# Patient Record
Sex: FEMALE | Race: WHITE | NOT HISPANIC OR LATINO | Employment: FULL TIME
[De-identification: names, ages, dates, MRNs, and addresses within clinical notes are randomized per-mention and may not be internally consistent; named-entity substitution may affect disease eponyms.]

---

## 2017-09-17 ENCOUNTER — HEALTH MAINTENANCE LETTER (OUTPATIENT)
Age: 32
End: 2017-09-17

## 2020-01-30 ENCOUNTER — TELEPHONE (OUTPATIENT)
Dept: FAMILY MEDICINE | Facility: OTHER | Age: 35
End: 2020-01-30

## 2020-01-30 ENCOUNTER — OFFICE VISIT (OUTPATIENT)
Dept: FAMILY MEDICINE | Facility: OTHER | Age: 35
End: 2020-01-30
Payer: COMMERCIAL

## 2020-01-30 ENCOUNTER — RESULT FOLLOW UP (OUTPATIENT)
Dept: FAMILY MEDICINE | Facility: OTHER | Age: 35
End: 2020-01-30

## 2020-01-30 VITALS
HEIGHT: 65 IN | RESPIRATION RATE: 14 BRPM | SYSTOLIC BLOOD PRESSURE: 104 MMHG | TEMPERATURE: 96.9 F | BODY MASS INDEX: 26.99 KG/M2 | OXYGEN SATURATION: 100 % | DIASTOLIC BLOOD PRESSURE: 60 MMHG | HEART RATE: 73 BPM | WEIGHT: 162 LBS

## 2020-01-30 DIAGNOSIS — F33.2 SEVERE EPISODE OF RECURRENT MAJOR DEPRESSIVE DISORDER, WITHOUT PSYCHOTIC FEATURES (H): ICD-10-CM

## 2020-01-30 DIAGNOSIS — Z00.00 ROUTINE GENERAL MEDICAL EXAMINATION AT A HEALTH CARE FACILITY: Primary | ICD-10-CM

## 2020-01-30 DIAGNOSIS — Z00.00 ENCOUNTER FOR MEDICAL EXAMINATION TO ESTABLISH CARE: ICD-10-CM

## 2020-01-30 DIAGNOSIS — Z12.4 SCREENING FOR CERVICAL CANCER: ICD-10-CM

## 2020-01-30 DIAGNOSIS — A59.9 TRICHOMONAS INFECTION: ICD-10-CM

## 2020-01-30 DIAGNOSIS — Z11.3 SCREEN FOR STD (SEXUALLY TRANSMITTED DISEASE): ICD-10-CM

## 2020-01-30 DIAGNOSIS — F41.1 GAD (GENERALIZED ANXIETY DISORDER): ICD-10-CM

## 2020-01-30 DIAGNOSIS — F15.91 HISTORY OF METHAMPHETAMINE USE: ICD-10-CM

## 2020-01-30 DIAGNOSIS — Z13.1 SCREENING FOR DIABETES MELLITUS: ICD-10-CM

## 2020-01-30 DIAGNOSIS — Z13.6 CARDIOVASCULAR SCREENING; LDL GOAL LESS THAN 160: ICD-10-CM

## 2020-01-30 LAB
SPECIMEN SOURCE: ABNORMAL
WET PREP SPEC: ABNORMAL

## 2020-01-30 PROCEDURE — 99213 OFFICE O/P EST LOW 20 MIN: CPT | Mod: 25 | Performed by: NURSE PRACTITIONER

## 2020-01-30 PROCEDURE — 87591 N.GONORRHOEAE DNA AMP PROB: CPT | Performed by: NURSE PRACTITIONER

## 2020-01-30 PROCEDURE — 87624 HPV HI-RISK TYP POOLED RSLT: CPT | Performed by: NURSE PRACTITIONER

## 2020-01-30 PROCEDURE — 87210 SMEAR WET MOUNT SALINE/INK: CPT | Performed by: NURSE PRACTITIONER

## 2020-01-30 PROCEDURE — G0476 HPV COMBO ASSAY CA SCREEN: HCPCS | Performed by: NURSE PRACTITIONER

## 2020-01-30 PROCEDURE — 99385 PREV VISIT NEW AGE 18-39: CPT | Performed by: NURSE PRACTITIONER

## 2020-01-30 PROCEDURE — 87491 CHLMYD TRACH DNA AMP PROBE: CPT | Performed by: NURSE PRACTITIONER

## 2020-01-30 PROCEDURE — G0145 SCR C/V CYTO,THINLAYER,RESCR: HCPCS | Performed by: NURSE PRACTITIONER

## 2020-01-30 RX ORDER — SERTRALINE HYDROCHLORIDE 25 MG/1
25 TABLET, FILM COATED ORAL DAILY
Qty: 30 TABLET | Refills: 1 | Status: SHIPPED | OUTPATIENT
Start: 2020-01-30 | End: 2020-03-02

## 2020-01-30 RX ORDER — METRONIDAZOLE 500 MG/1
2000 TABLET ORAL ONCE
Qty: 4 TABLET | Refills: 0 | Status: SHIPPED | OUTPATIENT
Start: 2020-01-30 | End: 2020-03-02

## 2020-01-30 ASSESSMENT — ENCOUNTER SYMPTOMS
FEVER: 0
ARTHRALGIAS: 0
MYALGIAS: 0
NERVOUS/ANXIOUS: 1
CHILLS: 0
HEADACHES: 0
DYSURIA: 0
SORE THROAT: 0
COUGH: 0
JOINT SWELLING: 0
SHORTNESS OF BREATH: 0
PALPITATIONS: 0
ABDOMINAL PAIN: 1
HEMATURIA: 0
WEAKNESS: 0
CONSTIPATION: 0
EYE PAIN: 0
DIARRHEA: 0
FREQUENCY: 0
NAUSEA: 0
DIZZINESS: 0
HEARTBURN: 0
PARESTHESIAS: 0
BREAST MASS: 0
HEMATOCHEZIA: 0

## 2020-01-30 ASSESSMENT — PAIN SCALES - GENERAL: PAINLEVEL: NO PAIN (0)

## 2020-01-30 ASSESSMENT — MIFFLIN-ST. JEOR: SCORE: 1430.09

## 2020-01-30 ASSESSMENT — PATIENT HEALTH QUESTIONNAIRE - PHQ9: SUM OF ALL RESPONSES TO PHQ QUESTIONS 1-9: 9

## 2020-01-30 NOTE — NURSING NOTE
Health Maintenance Due   Topic Date Due     PREVENTIVE CARE VISIT  1983     DTAP/TDAP/TD IMMUNIZATION (1 - Tdap) 06/30/1994     HIV SCREENING  06/30/1998     PNEUMOCOCCAL IMMUNIZATION 19-64 MEDIUM RISK (1 of 1 - PPSV23) 06/30/2002     PAP  06/30/2004     INFLUENZA VACCINE (1) 09/01/2019     PHQ-2  01/01/2020      .Health Maintenance reviewed at today's visit patient asked to schedule/complete:   Immunizations:  Patient agrees to schedule     Apple Padilla LPN........1/30/2020 10:12 AM

## 2020-01-30 NOTE — TELEPHONE ENCOUNTER
----- Message from JAYSON Seymour CNP sent at 1/30/2020 11:42 AM CST -----  Please call and let her know her wet prep showed trichomonas.  I sent over Flagyl for her to take for this.      The rest of the labs are pending.     Electronically signed by Cristina De Anda CNP.

## 2020-01-30 NOTE — PATIENT INSTRUCTIONS
The results of the pap test take about 2 weeks to come back.  We will send a letter with these results and the recommendations for further pap tests in the future.     Schedule a lab visit and be fasting for that.     Start Sertraline for mood.      Follow up in 1-2 months.       Preventive Health Recommendations  Female Ages 26 - 39  Yearly exam:   See your health care provider every year in order to    Review health changes.     Discuss preventive care.      Review your medicines if you your doctor has prescribed any.    Until age 30: Get a Pap test every three years (more often if you have had an abnormal result).    After age 30: Talk to your doctor about whether you should have a Pap test every 3 years or have a Pap test with HPV screening every 5 years.   You do not need a Pap test if your uterus was removed (hysterectomy) and you have not had cancer.  You should be tested each year for STDs (sexually transmitted diseases), if you're at risk.   Talk to your provider about how often to have your cholesterol checked.  If you are at risk for diabetes, you should have a diabetes test (fasting glucose).  Shots: Get a flu shot each year. Get a tetanus shot every 10 years.   Nutrition:     Eat at least 5 servings of fruits and vegetables each day.    Eat whole-grain bread, whole-wheat pasta and brown rice instead of white grains and rice.    Get adequate Calcium and Vitamin D.     Lifestyle    Exercise at least 150 minutes a week (30 minutes a day, 5 days of the week). This will help you control your weight and prevent disease.    Limit alcohol to one drink per day.    No smoking.     Wear sunscreen to prevent skin cancer.    See your dentist every six months for an exam and cleaning.

## 2020-01-30 NOTE — PROGRESS NOTES
SUBJECTIVE:   CC: Dyan Shanks is an 34 year old woman who presents for preventive health visit.     Healthy Habits:     Getting at least 3 servings of Calcium per day:  Yes    Bi-annual eye exam:  NO    Dental care twice a year:  Yes    Sleep apnea or symptoms of sleep apnea:  None    Diet:  Regular (no restrictions)    Frequency of exercise:  None    Taking medications regularly:  Yes    Medication side effects:  None    PHQ-2 Total Score: 2    Additional concerns today:  No    Also wants to establish care and discuss depression.  History of methamphetamine use, has been clean for 2.5 months now.  Did this on her own, is not set up with any treatment program.  She is set up to see a counselor in [place] in 2 weeks.  Feels she is managing okay, but has some anxiety and depression symptoms.  Has been on Sertraline previously, unsure why she stopped this but wants to get back on something.  Denies thoughts of self harm.      Also wants STD screening. No symptoms and no know exposures but wants testing for everything.        PHQ-9 score:    PHQ-9 SCORE 1/30/2020   PHQ-9 Total Score 9       Today's PHQ-2 Score:   PHQ-2 ( 1999 [employer]) 1/30/2020   Q1: Little interest or pleasure in doing things 1   Q2: Feeling down, depressed or hopeless 1   PHQ-2 Score 2   Q1: Little interest or pleasure in doing things Several days   Q2: Feeling down, depressed or hopeless Several days   PHQ-2 Score 2       Social History     Tobacco Use     Smoking status: Current Every Day Smoker     Packs/day: 0.50     Types: Cigarettes     Smokeless tobacco: Never Used   Substance Use Topics     Alcohol use: Yes     Comment: socially       Alcohol Use 1/30/2020   Prescreen: >3 drinks/day or >7 drinks/week? No   No flowsheet data found.    Reviewed orders with patient.  Reviewed health maintenance and updated orders accordingly - Yes  Lab work is in process    Mammogram not appropriate for this patient based on age.    Pertinent mammograms  "are reviewed under the imaging tab.  History of abnormal Pap smear: Last 3 Pap Results: No results found for: PAP     Reviewed and updated as needed this visit by clinical staff  Tobacco  Allergies  Meds  Med Hx  Surg Hx  Fam Hx  Soc Hx        Reviewed and updated as needed this visit by Provider            Review of Systems   Constitutional: Negative for chills and fever.   HENT: Negative for congestion, ear pain, hearing loss and sore throat.    Eyes: Negative for pain and visual disturbance.   Respiratory: Negative for cough and shortness of breath.    Cardiovascular: Negative for chest pain, palpitations and peripheral edema.   Gastrointestinal: Positive for abdominal pain. Negative for constipation, diarrhea, heartburn, hematochezia and nausea.   Breasts:  Negative for tenderness, breast mass and discharge.   Genitourinary: Positive for pelvic pain, vaginal bleeding and vaginal discharge. Negative for dysuria, frequency, genital sores, hematuria and urgency.   Musculoskeletal: Negative for arthralgias, joint swelling and myalgias.   Skin: Negative for rash.   Neurological: Negative for dizziness, weakness, headaches and paresthesias.   Psychiatric/Behavioral: Negative for mood changes. The patient is nervous/anxious.        OBJECTIVE:   /60 (BP Location: Left arm, Patient Position: Sitting, Cuff Size: Adult Regular)   Pulse 73   Temp 96.9  F (36.1  C) (Temporal)   Resp 14   Ht 1.658 m (5' 5.28\")   Wt 73.5 kg (162 lb)   LMP 12/30/2019   SpO2 100%   Breastfeeding No   BMI 26.73 kg/m    Physical Exam  GENERAL: healthy, alert and no distress  EYES: Eyes grossly normal to inspection, PERRL and conjunctivae and sclerae normal  HENT: ear canals and TM's normal, nose and mouth without ulcers or lesions  NECK: no adenopathy, no asymmetry, masses, or scars and thyroid normal to palpation  RESP: lungs clear to auscultation - no rales, rhonchi or wheezes  BREAST: normal without masses, tenderness or " nipple discharge and no palpable axillary masses or adenopathy  CV: regular rate and rhythm, normal S1 S2, no S3 or S4, no murmur, click or rub  ABDOMEN: soft, nontender, no hepatosplenomegaly, no masses and bowel sounds normal   (female): normal female external genitalia, normal urethral meatus, vaginal mucosa pink, moist, well rugated, and normal cervix without masses or discharge  MS: no gross musculoskeletal defects noted, no edema  SKIN: no suspicious lesions or rashes  NEURO: Normal strength and tone, mentation intact and speech normal  PSYCH: mentation appears normal, affect normal/bright    Diagnostic Test Results:  Office Visit on 01/30/2020   Component Date Value Ref Range Status     Specimen Description 01/30/2020 Vagina   Final     Wet Prep 01/30/2020 Trichomonas seen*  Final     Wet Prep 01/30/2020 No clue cells seen   Final     Wet Prep 01/30/2020 No yeast seen   Final     Wet Prep 01/30/2020    Final                    Value:Few  WBC'S seen           ASSESSMENT/PLAN:   1. Routine general medical examination at a health care facility    2. Encounter for medical examination to establish care    3. KARMEN (generalized anxiety disorder)  Discussed treatment options including pharmacologic and nonpharmacologic options.  Patient is set up to start counseling in [place].  Start Sertraline as prescribed.  Risks/benefits discussed, including risk of suicidal ideations.  Patient denies any suicidal ideations today.  Discussed that medication will take 4-6 weeks to start working.  Follow up for recheck in 1 month.    - sertraline (ZOLOFT) 25 MG tablet; Take 1 tablet (25 mg) by mouth daily  Dispense: 30 tablet; Refill: 1    4. Severe episode of recurrent major depressive disorder, without psychotic features (H)  Discussed treatment options including pharmacologic and nonpharmacologic options.  Patient is set up to start counseling in [place].  Start Sertraline as prescribed.  Risks/benefits discussed,  "including risk of suicidal ideations.  Patient denies any suicidal ideations today.  Discussed that medication will take 4-6 weeks to start working.  Follow up for recheck in 1 month.    - sertraline (ZOLOFT) 25 MG tablet; Take 1 tablet (25 mg) by mouth daily  Dispense: 30 tablet; Refill: 1    5. Screening for cervical cancer  - Pap imaged thin layer screen with HPV - recommended age 30 - 65  - HPV High Risk Types DNA Cervical    6. Screen for STD (sexually transmitted disease)  - HIV Screening; Future  - **Hepatitis C Screen Reflex to RNA FUTURE anytime; Future  - Hepatitis B surface antigen; Future  - HIV Antigen Antibody Combo; Future  - Treponema Abs w Reflex to RPR and Titer; Future  - Wet prep  - NEISSERIA GONORRHOEA PCR  - CHLAMYDIA TRACHOMATIS PCR    7. CARDIOVASCULAR SCREENING; LDL GOAL LESS THAN 160  - Lipid panel reflex to direct LDL Fasting; Future    8. Screening for diabetes mellitus  - **Glucose FUTURE anytime; Future    9. Trichomonas infection  - metroNIDAZOLE (FLAGYL) 500 MG tablet; Take 4 tablets (2,000 mg) by mouth once for 1 dose  Dispense: 4 tablet; Refill: 0    COUNSELING:  Reviewed preventive health counseling, as reflected in patient instructions    Estimated body mass index is 26.73 kg/m  as calculated from the following:    Height as of this encounter: 1.658 m (5' 5.28\").    Weight as of this encounter: 73.5 kg (162 lb).    Weight management plan: Discussed healthy diet and exercise guidelines     reports that she has been smoking cigarettes. She has been smoking about 0.50 packs per day. She has never used smokeless tobacco.  Tobacco Cessation Action Plan: Information offered: Patient not interested at this time    Counseling Resources:  ATP IV Guidelines  Pooled Cohorts Equation Calculator  Breast Cancer Risk Calculator  FRAX Risk Assessment  ICSI Preventive Guidelines  Dietary Guidelines for Americans, 2010  USDA's MyPlate  ASA Prophylaxis  Lung CA Screening    In addition to the " preventive visit, 25  minutes of the appointment were spent evaluating and developing a treatment plan for her additional concern(s).        JAYSON Seymour [facility]

## 2020-01-30 NOTE — TELEPHONE ENCOUNTER
LMTCB- When patient returns call please relay message from provider. Apple Padilla LPN........1/30/2020 12:53 PM

## 2020-01-31 ENCOUNTER — TELEPHONE (OUTPATIENT)
Dept: FAMILY MEDICINE | Facility: OTHER | Age: 35
End: 2020-01-31

## 2020-01-31 DIAGNOSIS — A74.9 CHLAMYDIA INFECTION: Primary | ICD-10-CM

## 2020-01-31 LAB
C TRACH DNA SPEC QL NAA+PROBE: POSITIVE
N GONORRHOEA DNA SPEC QL NAA+PROBE: NEGATIVE
SPECIMEN SOURCE: ABNORMAL
SPECIMEN SOURCE: NORMAL

## 2020-01-31 RX ORDER — AZITHROMYCIN 500 MG/1
1000 TABLET, FILM COATED ORAL DAILY
Qty: 2 TABLET | Refills: 0 | Status: SHIPPED | OUTPATIENT
Start: 2020-01-31 | End: 2020-03-02

## 2020-01-31 NOTE — TELEPHONE ENCOUNTER
----- Message from JAYSON Seymour CNP sent at 1/31/2020  2:27 PM CST -----  Please call and let patient know her chlamydia test was positive.  I have sent over a prescription for her to take for this.  She should have follow up testing done in 3 months.     Electronically signed by Cristina De Anda CNP.

## 2020-01-31 NOTE — TELEPHONE ENCOUNTER
Called and LM for patient to call back. Please relay results below to patient. Cristina sent patient results in mychart as well.     Valentina Kaur MA

## 2020-01-31 NOTE — LETTER
February 3, 2020      Dyan Shanks  [id number] [facility] [address]        Dear ,    We are writing to inform you of your test results.    A prescription has been sent to your pharmacy. Please have retesting done in 3 months.     Resulted Orders   Wet prep   Result Value Ref Range    Specimen Description Vagina     Wet Prep Trichomonas seen (A)     Wet Prep No clue cells seen     Wet Prep No yeast seen     Wet Prep Few  WBC'S seen      NEISSERIA GONORRHOEA PCR   Result Value Ref Range    Specimen Descrip Vagina     N Gonorrhea PCR Negative NEG^Negative      Comment:      Negative for N. gonorrhoeae rRNA by transcription mediated amplification.  A negative result by transcription mediated amplification does not preclude   the presence of N. gonorrhoeae infection because results are dependent on   proper and adequate collection, absence of inhibitors, and sufficient rRNA to   be detected.     CHLAMYDIA TRACHOMATIS PCR   Result Value Ref Range    Specimen Description Vagina     Chlamydia Trachomatis PCR Positive (A) NEG^Negative      Comment:      Positive for C. trachomatis rRNA by transcription mediated amplification.  As is true for all non-culture methods, a positive specimen obtained from a   patient after therapeutic treatment cannot be interpreted as indicating the   presence of viable C. trachomatis.  Significant Value faxed/printed to  [facility] at 1330 on 1.31.20 rd.         If you have any questions or concerns, please call the clinic at the number listed above.       Sincerely,        JAYSON Seymour CNP

## 2020-02-03 LAB
COPATH REPORT: NORMAL
PAP: NORMAL

## 2020-02-03 NOTE — TELEPHONE ENCOUNTER
Patient called back, relayed message above.        Estephania Pina ~ Patient Representative  [address] [address]  [email or web address]  [email or web address]  Office:  [phone]  Fax:  [phone]

## 2020-02-03 NOTE — TELEPHONE ENCOUNTER
2nd attempt, LM for patient to call back. Please relay results below to patient.     Letter sent to patient as well.     Routing to pcp as FYI unable to reach patient, have called sent mychart and letter.       Valentina Kaur MA

## 2020-02-04 LAB
FINAL DIAGNOSIS: ABNORMAL
HPV HR 12 DNA CVX QL NAA+PROBE: POSITIVE
HPV16 DNA SPEC QL NAA+PROBE: NEGATIVE
HPV18 DNA SPEC QL NAA+PROBE: NEGATIVE
SPECIMEN DESCRIPTION: ABNORMAL
SPECIMEN SOURCE CVX/VAG CYTO: ABNORMAL

## 2020-02-05 PROBLEM — R87.810 CERVICAL HIGH RISK HPV (HUMAN PAPILLOMAVIRUS) TEST POSITIVE: Status: ACTIVE | Noted: 2020-01-30

## 2020-03-02 ENCOUNTER — OFFICE VISIT (OUTPATIENT)
Dept: FAMILY MEDICINE | Facility: OTHER | Age: 35
End: 2020-03-02
Payer: COMMERCIAL

## 2020-03-02 VITALS
BODY MASS INDEX: 28.31 KG/M2 | DIASTOLIC BLOOD PRESSURE: 70 MMHG | TEMPERATURE: 97.6 F | HEART RATE: 67 BPM | SYSTOLIC BLOOD PRESSURE: 124 MMHG | RESPIRATION RATE: 16 BRPM | WEIGHT: 171.6 LBS | OXYGEN SATURATION: 100 %

## 2020-03-02 DIAGNOSIS — F33.2 SEVERE EPISODE OF RECURRENT MAJOR DEPRESSIVE DISORDER, WITHOUT PSYCHOTIC FEATURES (H): Primary | ICD-10-CM

## 2020-03-02 DIAGNOSIS — M79.671 BILATERAL FOOT PAIN: ICD-10-CM

## 2020-03-02 DIAGNOSIS — M79.672 BILATERAL FOOT PAIN: ICD-10-CM

## 2020-03-02 DIAGNOSIS — L30.9 ECZEMA, UNSPECIFIED TYPE: ICD-10-CM

## 2020-03-02 DIAGNOSIS — F41.1 GAD (GENERALIZED ANXIETY DISORDER): ICD-10-CM

## 2020-03-02 PROCEDURE — 99214 OFFICE O/P EST MOD 30 MIN: CPT | Performed by: NURSE PRACTITIONER

## 2020-03-02 RX ORDER — TRIAMCINOLONE ACETONIDE 1 MG/G
OINTMENT TOPICAL 2 TIMES DAILY
Qty: 30 G | Refills: 0 | Status: SHIPPED | OUTPATIENT
Start: 2020-03-02 | End: 2021-05-12

## 2020-03-02 ASSESSMENT — PAIN SCALES - GENERAL: PAINLEVEL: NO PAIN (0)

## 2020-03-02 NOTE — NURSING NOTE
Health Maintenance Due   Topic Date Due     DEPRESSION ACTION PLAN  1983     DTAP/TDAP/TD IMMUNIZATION (2 - Tdap) 06/30/1994     HIV SCREENING  06/30/1998     PNEUMOCOCCAL IMMUNIZATION 19-64 MEDIUM RISK (1 of 1 - PPSV23) 06/30/2002     PAP FOLLOW-UP  01/30/2021     HPV FOLLOW-UP  01/30/2021      Health Maintenance reviewed at today's visit patient asked to schedule/complete:   Cervical Cancer:  Patient agrees to schedule  Immunizations:  Patient declined     Apple Padilla LPN........3/2/2020 3:27 PM

## 2020-03-02 NOTE — PATIENT INSTRUCTIONS
Referral for podiatry placed.     Use the steroid cream twice a day until the rash resolves.     Increase your Sertraline to 50 mg daily.  Recheck in 4 weeks.

## 2020-03-02 NOTE — PROGRESS NOTES
Subjective     Dyan Shanks is a 34 year old female who presents to clinic today for the following health issues:    HPI   Rash  Onset: Ongoing for a while     Description:   Location: bilateral legs   Character: round, raised, flakey, red  Itching (Pruritis): YES    Progression of Symptoms:  worsening    Accompanying Signs & Symptoms:  Fever: no   Body aches or joint pain: no   Sore throat symptoms: no   Recent cold symptoms: no     History:   Previous similar rash: YES- dx with eczema or psoriasis      Precipitating factors:   Exposure to similar rash: no   New exposures: None   Recent travel: no     Alleviating factors:  NA     Therapies Tried and outcome: None     Depression and Anxiety Follow-Up    How are you doing with your depression since your last visit? Improved     How are you doing with your anxiety since your last visit?  Improved     Are you having other symptoms that might be associated with depression or anxiety? No    Have you had a significant life event? No     Do you have any concerns with your use of alcohol or other drugs? No    Social History     Tobacco Use     Smoking status: Former Smoker     Packs/day: 0.50     Types: Cigarettes     Smokeless tobacco: Never Used   Substance Use Topics     Alcohol use: Yes     Comment: socially     Drug use: No     PHQ 1/30/2020   PHQ-9 Total Score 9   Q9: Thoughts of better off dead/self-harm past 2 weeks Not at all     No flowsheet data found.    Started on Sertraline 6 weeks ago.  Symptoms slightly improved.  No side effects.          Review of Systems   ROS COMP: Constitutional, HEENT, cardiovascular, pulmonary, gi and gu systems are negative, except as otherwise noted.      Objective    /70 (BP Location: Left arm, Patient Position: Sitting, Cuff Size: Adult Regular)   Pulse 67   Temp 97.6  F (36.4  C) (Temporal)   Resp 16   Wt 77.8 kg (171 lb 9.6 oz)   SpO2 100%   Breastfeeding No   BMI 28.31 kg/m    Body mass index is 28.31  kg/m .  Physical Exam   GENERAL: healthy, alert and no distress  MS: no gross musculoskeletal defects noted, no edema  SKIN: dry, scaling patches on both lower legs.  Consistent with eczema.  No open lesions or infection.   PSYCH: mentation appears normal, affect flat, judgement and insight intact and appearance well groomed    Diagnostic Test Results:  none         Assessment & Plan     1. Severe episode of recurrent major depressive disorder, without psychotic features (H)  Increase Sertraline to 50 mg daily and recheck in 4 weeks.    - sertraline (ZOLOFT) 50 MG tablet; Take 1 tablet (50 mg) by mouth daily  Dispense: 30 tablet; Refill: 3    2. KARMEN (generalized anxiety disorder)  Increase Sertraline to 50 mg daily and recheck in 4 weeks.    - sertraline (ZOLOFT) 50 MG tablet; Take 1 tablet (50 mg) by mouth daily  Dispense: 30 tablet; Refill: 3    3. Bilateral foot pain  - PODIATRY/FOOT & ANKLE SURGERY REFERRAL    4. Eczema, unspecified type  - triamcinolone (KENALOG) 0.1 % external ointment; Apply topically 2 times daily  Dispense: 30 g; Refill: 0       See Patient Instructions    Return in about 4 weeks (around 3/30/2020) for Follow up.    JAYSON Seymour [facility]

## 2020-03-12 ENCOUNTER — OFFICE VISIT (OUTPATIENT)
Dept: PODIATRY | Facility: CLINIC | Age: 35
End: 2020-03-12
Attending: NURSE PRACTITIONER
Payer: COMMERCIAL

## 2020-03-12 ENCOUNTER — ANCILLARY PROCEDURE (OUTPATIENT)
Dept: GENERAL RADIOLOGY | Facility: CLINIC | Age: 35
End: 2020-03-12
Attending: PODIATRIST
Payer: COMMERCIAL

## 2020-03-12 VITALS
HEIGHT: 65 IN | WEIGHT: 170 LBS | DIASTOLIC BLOOD PRESSURE: 74 MMHG | SYSTOLIC BLOOD PRESSURE: 110 MMHG | BODY MASS INDEX: 28.32 KG/M2

## 2020-03-12 DIAGNOSIS — M79.672 BILATERAL FOOT PAIN: ICD-10-CM

## 2020-03-12 DIAGNOSIS — M79.671 BILATERAL FOOT PAIN: ICD-10-CM

## 2020-03-12 DIAGNOSIS — L85.9 HYPERKERATOSIS: Primary | ICD-10-CM

## 2020-03-12 DIAGNOSIS — Q66.6 PES VALGUS OF RIGHT FOOT: ICD-10-CM

## 2020-03-12 PROCEDURE — 99243 OFF/OP CNSLTJ NEW/EST LOW 30: CPT | Mod: 25 | Performed by: PODIATRIST

## 2020-03-12 PROCEDURE — 73630 X-RAY EXAM OF FOOT: CPT | Mod: TC

## 2020-03-12 PROCEDURE — 11056 PARNG/CUTG B9 HYPRKR LES 2-4: CPT | Performed by: PODIATRIST

## 2020-03-12 ASSESSMENT — PAIN SCALES - GENERAL: PAINLEVEL: SEVERE PAIN (7)

## 2020-03-12 ASSESSMENT — MIFFLIN-ST. JEOR: SCORE: 1466.43

## 2020-03-12 NOTE — PROGRESS NOTES
HPI:  Dyan Shanks is a 34 year old female who is seen in consultation at the request of Cristina De Anda, JAYSON, CNP    Pt presents for eval of:   (Onset, Location, L/R, Character, Treatments, Injury if yes)    XR Left and Right foot today 3/12/2020     Ongoing for 1+ year, plantar Left and Right ball of foot callus pain. No injury noted. Presents today with compression socks and athletic shoes.  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    Weight management plan: Patient was referred to their PCP to discuss a diet and exercise plan.     Patient to follow up with Primary Care provider regarding elevated blood pressure.    ROS:  10 point ROS neg other than the symptoms noted above in the HPI.    Patient Active Problem List   Diagnosis     CARDIOVASCULAR SCREENING; LDL GOAL LESS THAN 160     Spontaneous onset of labor after 37 but before 39 completed weeks gestation with delivery by planned  section     Insufficient prenatal care     History of methamphetamine use      (normal spontaneous vaginal delivery)     Anemia     KARMEN (generalized anxiety disorder)     Severe episode of recurrent major depressive disorder, without psychotic features (H)     Cervical high risk HPV (human papillomavirus) test positive       PAST MEDICAL HISTORY:   Past Medical History:   Diagnosis Date     Anxiety      Cervical high risk HPV (human papillomavirus) test positive 2020    See problem list     Depression      Methamphetamine use (H)         PAST SURGICAL HISTORY: History reviewed. No pertinent surgical history.     MEDICATIONS:   Current Outpatient Medications:      levonorgestrel (MIRENA) 20 MCG/24HR IUD, 1 each by Intrauterine route once Replace 2023, Disp: , Rfl:      sertraline (ZOLOFT) 50 MG tablet, Take 1 tablet (50 mg) by mouth daily, Disp: 30 tablet, Rfl:  3     triamcinolone (KENALOG) 0.1 % external ointment, Apply topically 2 times daily, Disp: 30 g, Rfl: 0     ALLERGIES:    Allergies   Allergen Reactions     Blood Transfusion Related (Informational Only) Other (See Comments)     Patient has a history of a clinically significant antibody against RBC antigens.  A delay in compatible RBCs may occur.     Blood-Group Specific Substance      Patient has an anti-Big E. Blood product orders may be delayed. Draw two purple top tubes and one red top tube for all Type and Screen/Type and Crossmatch orders.     Oxycodone-Acetaminophen Itching and Nausea and Vomiting     Legs itch        SOCIAL HISTORY:   Social History     Socioeconomic History     Marital status: Single     Spouse name: Not on file     Number of children: Not on file     Years of education: Not on file     Highest education level: Not on file   Occupational History     Not on file   Social Needs     Financial resource strain: Not on file     Food insecurity     Worry: Not on file     Inability: Not on file     Transportation needs     Medical: Not on file     Non-medical: Not on file   Tobacco Use     Smoking status: Former Smoker     Packs/day: 0.50     Types: Cigarettes     Smokeless tobacco: Never Used   Substance and Sexual Activity     Alcohol use: Yes     Comment: socially     Drug use: No     Sexual activity: Not Currently     Partners: Male   Lifestyle     Physical activity     Days per week: Not on file     Minutes per session: Not on file     Stress: Not on file   Relationships     Social connections     Talks on phone: Not on file     Gets together: Not on file     Attends Taoist service: Not on file     Active member of club or organization: Not on file     Attends meetings of clubs or organizations: Not on file     Relationship status: Not on file     Intimate partner violence     Fear of current or ex partner: Not on file     Emotionally abused: Not on file     Physically abused: Not on file  "    Forced sexual activity: Not on file   Other Topics Concern     Not on file   Social History Narrative     Not on file        FAMILY HISTORY: History reviewed. No pertinent family history.     EXAM:Vitals: /74 (BP Location: Left arm, Cuff Size: Adult Regular)   Ht 1.658 m (5' 5.28\")   Wt 77.1 kg (170 lb)   BMI 28.05 kg/m    BMI= Body mass index is 28.05 kg/m .    General appearance: Patient is alert and fully cooperative with history & exam.  No sign of distress is noted during the visit.     Psychiatric: Affect is pleasant & appropriate.  Patient appears motivated to improve health.     Respiratory: Breathing is regular & unlabored while sitting.     HEENT: Hearing is intact to spoken word.  Speech is clear.  No gross evidence of visual impairment that would impact ambulation.     Vascular: DP & PT pulses are intact & regular bilaterally.  No significant edema or varicosities noted.  CFT and skin temperature is normal to both lower extremities.     Neurologic: Lower extremity sensation is intact to light touch.  No evidence of weakness or contracture in the lower extremities.  No evidence of neuropathy.    Dermatologic: Skin is intact to both lower extremities with adequate texture, turgor and tone about the integument.  No paronychia or evidence of soft tissue infection is noted.     Musculoskeletal: Patient is ambulatory without assistive device or brace.  Abnormal metatarsal length parabola with forefoot valgus noted bilateral.  Prominence about the second and third metatarsal head and hyperkeratosis about the plantar third metatarsal head bilateral.  Also fifth metatarsal head on the left foot has a demonstrate hyperkeratosis.  Skin lines remain intact.    Radiographs 3/12/2020 demonstrate abnormal metatarsal length consistent with pes valgus.  No acute cortical reaction.     ASSESSMENT:       ICD-10-CM    1. Hyperkeratosis  L85.9 ORTHOTICS REFERRAL     TRIM HYPERKERATOTIC SKIN LESION,2-4   2. Pes " valgus of right foot  Q66.6 TRIM HYPERKERATOTIC SKIN LESION,2-4        PLAN:  [facility]      3/12/2020   Obtained interpreted radiographs  Recommended appropriate shoe gear written instructions dispensed to provide more cushion in the forefoot stiff sturdy shank  Order for custom molded orthotic  I debrided 3 symptomatic hyperkeratotic lesions sharply with a 15 blade scalpel  Written instructions dispensed regarding abrasive debridement daily at home  Discussed alternative treatment options  Discussed surgical osteotomy as well  All questions were answered  Follow-up as needed.    Jozef Worley DPM

## 2020-03-12 NOTE — PATIENT INSTRUCTIONS
Reliable shoe stores: To maximize your experience and provide the best possible fit.  Be sure to show them your foot concerns and tell them Dr. Worley sent you.      Stores listed in bold have only athletic shoes, and stores that are not bold are mostly casual or variety of shoes    [employer]  [address] [place] [address]  [phone]    [facility] [employer] [facility] [place]  [address] [place] [address]  [phone]    [facility] [employer] [facility]  [address] [place] [address]  [phone]    [employer]  [address] [facility]  [facility] [address]  [phone]    [employer]  [address] [place], [place] [address] [phone]    [employer]  [address] [place] [address]  [phone]                         [employer] Locations:     [address] [place] [address]   [phone]     [address] [place] [address] [address] [place] [address]   [phone]     [address] [place] [address]   [phone]     [address] [place] [address]   [phone]     [address] [place] [address]   [phone]     [address] [place] [address]   [phone]     [address] [facility] [address]   [place] [address]   [phone]     [address] [facility]. [address]   [place] [address]   [phone]             How to find reasonable shoes          The correct width    Correct Fitting    Correct Length      Foot Distortion    Posture Distortion                          Torsional Rigidity      Grasp behind the heel and underneath the foot and twist      Bad    Excessive torsion/twist in midfoot     Less torsion/twist in midfoot is better                   Heel Counter Rigidity      Grasp just above   midsole and squeeze      Bad    Soft heel counter      Good    Rigid Heel Counter      Flexion Rigidity      Grasp shoe and bend from forefoot to rearfoot              Calluses, Corns, IPKs, Porokeratosis    When  there is excessive friction or pressure on the skin, the body responds by making the skin thicker.  While this may protect the deeper structures, the thickened skin can take up more space and thus increase pressure over a bony prominence or become an open sore or skin ulcer as this skin becomes less flexible.    Flat, diffuse thickening are simple calluses and they are usually caused by friction.  Often these are the result of rubbing on a shoe or going barefoot.    Calluses with a central core between the toes are called corns.  These result from prominent joints on adjacent toes rubbing together.  Theses are a symptom of bone malalignment and will always recur unless the underlying bones are addressed surgically.    Most of these lesions can be kept comfortable with routine maintenance. This consists of filing them with a Ped Egg, callus file, or 120 grit sandpaper on a block, every day during your bath or shower.  Most people prefer battery operated weaver such as an Amope', Personal Pedi and Emjoi for regular use or heavy painful callouses.  Heavy creams or ointments can be applied 1-2 times every day to keep them soft. Toe spacers can be used for corns, gel pads can be used for other lesions on the bottom of the foot. If there is a deformity noted, such as a prominent bone, often this can be addressed to minimize recurrence. However, sometimes the pressure and lesion simply migrates to another spot after surgery, so it is not a guaranteed cure.     www.pedifix.com is the best source for all sorts of foot pads and cushions    If you have severe callouses and cracking, you may apply heavy greasy cream or ointments that you scoop up such as Cetaphil cream, Eucerin, Aquaphor or Vaseline.  Be sure to obtain cream or ointment in these brands and not lotion (lotion is water based and not durable enough for feet). For more aggressive help apply heavy creams or ointment under occlusive dressings such as Saran Wrap or  Jelly Feet while sleeping.   Jelly Feet can be obtained at www.jellyfeet.com.     To be successful with managing hyperkeratotic skin, you must manage hygiene daily.  At your bath or shower time is the easiest time to work on this.  There is no medical or surgical treatment that will absolutely eliminate many of these and symptoms.    Please call with any additional questions.

## 2020-03-12 NOTE — LETTER
3/12/2020         RE: Dyan Shanks  [address] [address] [facility] [address]        Dear Colleague,    Thank you for referring your patient, Dyan Shanks, to the [facility]. Please see a copy of my visit note below.    HPI:  Dyan Shanks is a 34 year old female who is seen in consultation at the request of Cristina De Anda, APRN, CNP    Pt presents for eval of:   (Onset, Location, L/R, Character, Treatments, Injury if yes)    XR Left and Right foot today 3/12/2020     Ongoing for 1+ year, plantar Left and Right ball of foot callus pain. No injury noted. Presents today with compression socks and athletic shoes.  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    Weight management plan: Patient was referred to their PCP to discuss a diet and exercise plan.     Patient to follow up with Primary Care provider regarding elevated blood pressure.    ROS:  10 point ROS neg other than the symptoms noted above in the HPI.    Patient Active Problem List   Diagnosis     CARDIOVASCULAR SCREENING; LDL GOAL LESS THAN 160     Spontaneous onset of labor after 35 but before 39 completed weeks gestation with delivery by planned  section     Insufficient prenatal care     History of methamphetamine use      (normal spontaneous vaginal delivery)     Anemia     KARMEN (generalized anxiety disorder)     Severe episode of recurrent major depressive disorder, without psychotic features (H)     Cervical high risk HPV (human papillomavirus) test positive       PAST MEDICAL HISTORY:   Past Medical History:   Diagnosis Date     Anxiety      Cervical high risk HPV (human papillomavirus) test positive 2020    See problem list     Depression      Methamphetamine use (H)         PAST SURGICAL HISTORY: History reviewed. No pertinent surgical history.     MEDICATIONS:    Current Outpatient Medications:      levonorgestrel (MIRENA) 20 MCG/24HR IUD, 1 each by Intrauterine route once Replace 2/2023, Disp: , Rfl:      sertraline (ZOLOFT) 50 MG tablet, Take 1 tablet (50 mg) by mouth daily, Disp: 30 tablet, Rfl: 3     triamcinolone (KENALOG) 0.1 % external ointment, Apply topically 2 times daily, Disp: 30 g, Rfl: 0     ALLERGIES:    Allergies   Allergen Reactions     Blood Transfusion Related (Informational Only) Other (See Comments)     Patient has a history of a clinically significant antibody against RBC antigens.  A delay in compatible RBCs may occur.     Blood-Group Specific Substance      Patient has an anti-Big E. Blood product orders may be delayed. Draw two purple top tubes and one red top tube for all Type and Screen/Type and Crossmatch orders.     Oxycodone-Acetaminophen Itching and Nausea and Vomiting     Legs itch        SOCIAL HISTORY:   Social History     Socioeconomic History     Marital status: Single     Spouse name: Not on file     Number of children: Not on file     Years of education: Not on file     Highest education level: Not on file   Occupational History     Not on file   Social Needs     Financial resource strain: Not on file     Food insecurity     Worry: Not on file     Inability: Not on file     Transportation needs     Medical: Not on file     Non-medical: Not on file   Tobacco Use     Smoking status: Former Smoker     Packs/day: 0.50     Types: Cigarettes     Smokeless tobacco: Never Used   Substance and Sexual Activity     Alcohol use: Yes     Comment: socially     Drug use: No     Sexual activity: Not Currently     Partners: Male   Lifestyle     Physical activity     Days per week: Not on file     Minutes per session: Not on file     Stress: Not on file   Relationships     Social connections     Talks on phone: Not on file     Gets together: Not on file     Attends [employer] service: Not on file     Active member of club or organization: Not on file      "Attends meetings of clubs or organizations: Not on file     Relationship status: Not on file     Intimate partner violence     Fear of current or ex partner: Not on file     Emotionally abused: Not on file     Physically abused: Not on file     Forced sexual activity: Not on file   Other Topics Concern     Not on file   Social History Narrative     Not on file        FAMILY HISTORY: History reviewed. No pertinent family history.     EXAM:Vitals: /74 (BP Location: Left arm, Cuff Size: Adult Regular)   Ht 1.658 m (5' 5.28\")   Wt 77.1 kg (170 lb)   BMI 28.05 kg/m    BMI= Body mass index is 28.05 kg/m .    General appearance: Patient is alert and fully cooperative with history & exam.  No sign of distress is noted during the visit.     Psychiatric: Affect is pleasant & appropriate.  Patient appears motivated to improve health.     Respiratory: Breathing is regular & unlabored while sitting.     HEENT: Hearing is intact to spoken word.  Speech is clear.  No gross evidence of visual impairment that would impact ambulation.     Vascular: DP & PT pulses are intact & regular bilaterally.  No significant edema or varicosities noted.  CFT and skin temperature is normal to both lower extremities.     Neurologic: Lower extremity sensation is intact to light touch.  No evidence of weakness or contracture in the lower extremities.  No evidence of neuropathy.    Dermatologic: Skin is intact to both lower extremities with adequate texture, turgor and tone about the integument.  No paronychia or evidence of soft tissue infection is noted.     Musculoskeletal: Patient is ambulatory without assistive device or brace.  Abnormal metatarsal length parabola with forefoot valgus noted bilateral.  Prominence about the second and third metatarsal head and hyperkeratosis about the plantar third metatarsal head bilateral.  Also fifth metatarsal head on the left foot has a demonstrate hyperkeratosis.  Skin lines remain " intact.    Radiographs 3/12/2020 demonstrate abnormal metatarsal length consistent with pes valgus.  No acute cortical reaction.     ASSESSMENT:       ICD-10-CM    1. Hyperkeratosis  L85.9 ORTHOTICS REFERRAL     TRIM HYPERKERATOTIC SKIN LESION,2-4   2. Pes valgus of right foot  Q66.6 TRIM HYPERKERATOTIC SKIN LESION,2-4        PLAN:  Reviewed patient's chart in [facility].      3/12/2020   Obtained interpreted radiographs  Recommended appropriate shoe gear written instructions dispensed to provide more cushion in the forefoot stiff sturdy shank  Order for custom molded orthotic  I debrided 3 symptomatic hyperkeratotic lesions sharply with a 15 blade scalpel  Written instructions dispensed regarding abrasive debridement daily at home  Discussed alternative treatment options  Discussed surgical osteotomy as well  All questions were answered  Follow-up as needed.    Jozef Worley DPM      Again, thank you for allowing me to participate in the care of your patient.        Sincerely,        oJzef Worley DPM

## 2020-12-06 ENCOUNTER — HEALTH MAINTENANCE LETTER (OUTPATIENT)
Age: 35
End: 2020-12-06

## 2021-03-26 ENCOUNTER — PATIENT OUTREACH (OUTPATIENT)
Dept: FAMILY MEDICINE | Facility: CLINIC | Age: 36
End: 2021-03-26

## 2021-03-26 NOTE — LETTER
March 26, 2021      Dyanher LASHAWN Shanks  [address] [address] [place] [id number]        Dear MsPamRimma,    This letter is to remind you that you are due for your follow-up Pap smear and Human Papillomavirus (HPV) test.    Please call [phone] to schedule your appointment at your earliest convenience.    If you have completed the appointment outside of the [facility] system, please have the records forwarded to our office. We will update your chart for your provider to review before your next annual wellness visit.     Thank you for choosing [facility]!      Sincerely,    Your [facility] Care Team

## 2021-04-11 ENCOUNTER — HEALTH MAINTENANCE LETTER (OUTPATIENT)
Age: 36
End: 2021-04-11

## 2021-04-27 ENCOUNTER — HOSPITAL ENCOUNTER (EMERGENCY)
Facility: CLINIC | Age: 36
Discharge: HOME | End: 2021-04-27
Attending: EMERGENCY MEDICINE | Admitting: EMERGENCY MEDICINE
Payer: OTHER MISCELLANEOUS

## 2021-04-27 VITALS
RESPIRATION RATE: 18 BRPM | TEMPERATURE: 97.9 F | OXYGEN SATURATION: 98 % | BODY MASS INDEX: 29.73 KG/M2 | DIASTOLIC BLOOD PRESSURE: 21 MMHG | HEART RATE: 68 BPM | WEIGHT: 180.2 LBS | SYSTOLIC BLOOD PRESSURE: 128 MMHG

## 2021-04-27 DIAGNOSIS — S61.219A LACERATION OF FINGER, LEFT, INITIAL ENCOUNTER: ICD-10-CM

## 2021-04-27 PROCEDURE — 99283 EMERGENCY DEPT VISIT LOW MDM: CPT | Performed by: EMERGENCY MEDICINE

## 2021-04-27 PROCEDURE — 12001 RPR S/N/AX/GEN/TRNK 2.5CM/<: CPT | Performed by: EMERGENCY MEDICINE

## 2021-04-27 PROCEDURE — 99282 EMERGENCY DEPT VISIT SF MDM: CPT | Mod: 25 | Performed by: EMERGENCY MEDICINE

## 2021-04-27 NOTE — DISCHARGE INSTRUCTIONS
Return to the emergency department if you develop new or worsening symptoms such as swelling or spreading redness.  Keep the wound clean and dry.  Apply antibiotic ointment and fresh bandage every couple of days.  Sutures should be removed in about a week.  I hope you heal up quickly and it was a pleasure meeting you.

## 2021-04-27 NOTE — LETTER
April 27, 2021      To Whom It May Concern:      Dyan Shanks was seen in our Emergency Department today, 04/27/21.  I expect her condition to improve over the next 7 days.  She may return to work but must keep the wound clean and dry.     Sincerely,        Gavin Carpenter MD

## 2021-04-27 NOTE — ED PROVIDER NOTES
History     Chief Complaint   Patient presents with     Laceration     HPI  Dyan Shanks is a 35 year old female who presents to the ER secondary to a laceration sustained just  prior to arrival.  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  It is located on the left little finger.  Bleeding is controlled.  There is not loss of range of motion, numbness.  Prior to arrival the patient put pressure on it to stop the bleeding.  Tetanus vaccination is due.  Pain is mild.       Allergies:  Allergies   Allergen Reactions     Blood Transfusion Related (Informational Only) Other (See Comments)     Patient has a history of a clinically significant antibody against RBC antigens.  A delay in compatible RBCs may occur.     Blood-Group Specific Substance      Patient has an anti-Big E. Blood product orders may be delayed. Draw two purple top tubes and one red top tube for all Type and Screen/Type and Crossmatch orders.     Oxycodone-Acetaminophen Itching and Nausea and Vomiting     Legs itch       Problem List:    Patient Active Problem List    Diagnosis Date Noted     KARMEN (generalized anxiety disorder) 2020     Priority: Medium     Severe episode of recurrent major depressive disorder, without psychotic features (H) 2020     Priority: Medium     Cervical high risk HPV (human papillomavirus) test positive 2020     Priority: Medium     20 NIL pap, + HR HPV (not 16 or 18). Plan: cotest in 1 yr.  3/26/21 Reminder letter       Anemia 2014     Priority: Medium     Spontaneous onset of labor after 35 but before 37 completed weeks gestation with delivery by planned  section 2014     Priority: Medium     Insufficient prenatal care 2014     Priority: Medium     History of methamphetamine use 2014     Priority: Medium     + Utox at [facility] appointment, amphetamine and oxycodone        (normal spontaneous vaginal delivery)  09/01/2014     Priority: Medium     CARDIOVASCULAR SCREENING; LDL GOAL LESS THAN 160 10/31/2010     Priority: Medium        Past Medical History:    Past Medical History:   Diagnosis Date     Anxiety      Cervical high risk HPV (human papillomavirus) test positive 1/30/2020     Depression      Methamphetamine use (H)        Past Surgical History:    No past surgical history on file.    Family History:    No family history on file.    Social History:  Marital Status:  Single [1]  Social History     Tobacco Use     Smoking status: Former Smoker     Packs/day: 0.50     Types: Cigarettes     Smokeless tobacco: Never Used   Substance Use Topics     Alcohol use: Yes     Comment: socially     Drug use: No        Medications:    levonorgestrel (MIRENA) 20 MCG/24HR IUD  triamcinolone (KENALOG) 0.1 % external ointment          Review of Systems   All other systems reviewed and are negative.      Physical Exam   BP: 131/83  Pulse: 69  Temp: 97.9  F (36.6  C)  Resp: 18  Weight: 81.7 kg (180 lb 3.2 oz)  SpO2: 98 %      Physical Exam  Vitals signs and nursing note reviewed.   Constitutional:       General: She is not in acute distress.     Appearance: Normal appearance. She is well-developed. She is not ill-appearing or diaphoretic.   HENT:      Head: Normocephalic and atraumatic.      Nose: Nose normal. No congestion or rhinorrhea.   Eyes:      General: No scleral icterus.        Right eye: No discharge.         Left eye: No discharge.      Extraocular Movements: Extraocular movements intact.      Conjunctiva/sclera: Conjunctivae normal.   Neck:      Musculoskeletal: Normal range of motion and neck supple.   Musculoskeletal: Normal range of motion.   Skin:     General: Skin is warm and dry.      Coloration: Skin is not pale.      Findings: No erythema or rash.      Comments: 1.5 cm curvilinear laceration over the dorsal aspect of the left little finger.  It is full-thickness but does not involve the tendon.   Neurological:       General: No focal deficit present.      Mental Status: She is alert and oriented to person, place, and time.   Psychiatric:         Mood and Affect: Mood normal.         Thought Content: Thought content normal.         ED Course        Procedures          [facility] Procedure Note        Laceration Repair:    Performed by: Gavin Carpenter MD  Authorized by: Gavin Carpenter MD  Consent given by: Patient who states understanding of the procedure being performed after discussing the risks, benefits and alternatives.    Preparation: Patient was prepped and draped in usual sterile fashion.  Irrigation solution: saline    Body area:left little finger  Laceration length: 1.5 cm  Contamination: The wound is not contaminated.  Foreign bodies:none  Tendon involvement: none  Anesthesia: Digital block  Local anesthetic: Lidocaine     1%  Anesthetic total: 4ml    Debridement: none  Skin closure: Closed with 5 x 5.0 Ethilon  Technique: interrupted  Approximation: close  Approximation difficulty: simple    Patient tolerance: Patient tolerated the procedure well with no immediate complications.           No results found for this or any previous visit (from the past 24 hour(s)).    Medications - No data to display    Assessments & Plan (with Medical Decision Making)  Laceration of left little finger repaired as above.  No tendon involvement.  The wound was cleaned.  Return to ER precautions and follow-up precautions discussed.  Suture removal in 7 days.  Wound care discussed with patient.  Patient declines tetanus vaccination at this time.     I have reviewed the nursing notes.    I have reviewed the findings, diagnosis, plan and need for follow up with the patient.      New Prescriptions    No medications on file       Final diagnoses:   Laceration of finger, left, initial encounter       4/27/2021   [facility] EMERGENCY DEPT     Gavin Carpenter MD  04/27/21 [id number]

## 2021-05-04 ENCOUNTER — ALLIED HEALTH/NURSE VISIT (OUTPATIENT)
Dept: FAMILY MEDICINE | Facility: CLINIC | Age: 36
End: 2021-05-04

## 2021-05-04 DIAGNOSIS — Z48.02 ENCOUNTER FOR REMOVAL OF SUTURES: Primary | ICD-10-CM

## 2021-05-04 PROCEDURE — 99207 PR NO CHARGE NURSE ONLY: CPT

## 2021-05-04 NOTE — PROGRESS NOTES
Dyan Shanks presents to the clinic for removal of sutures. The patient has had sutures in place for 7 days. There has been no patient reported signs or symptoms of infection or drainage. 4  sutures are seen and located on the left medial surface of little finger. Tetanus status is up to date. All sutures were easily removed today. Routine wound care discussed by the RN or provider. The patient will follow up as needed.    Patient tolerated procedure well.      Nettie Rosenbaum RN  [facility]

## 2021-05-06 NOTE — PROGRESS NOTES
SUBJECTIVE:   CC: Dyan Shanks is an 35 year old woman who presents for preventive health visit.       Patient has been advised of split billing requirements and indicates understanding: Yes  Healthy Habits:     Getting at least 3 servings of Calcium per day:  Yes    Bi-annual eye exam:  NO    Dental care twice a year:  NO    Sleep apnea or symptoms of sleep apnea:  None    Diet:  Regular (no restrictions)    Frequency of exercise:  2-3 days/week    Duration of exercise:  30-45 minutes    Taking medications regularly:  Yes    Medication side effects:  Not applicable    PHQ-2 Total Score: 0    Additional concerns today:  Yes    Rash on lower legs x year  Itches and is spreading to arms  Tried triamcinolone     Today's PHQ-2 Score:   PHQ-2 ( 1999 Pfizer) 5/12/2021   Q1: Little interest or pleasure in doing things 0   Q2: Feeling down, depressed or hopeless 0   PHQ-2 Score 0   Q1: Little interest or pleasure in doing things Not at all   Q2: Feeling down, depressed or hopeless Not at all   PHQ-2 Score 0       Abuse: Current or Past (Physical, Sexual or Emotional) - No  Do you feel safe in your environment? Yes    Have you ever done Advance Care Planning? (For example, a Health Directive, POLST, or a discussion with a medical provider or your loved ones about your wishes): No, advance care planning information given to patient to review.  Patient plans to discuss their wishes with loved ones or provider.      Social History     Tobacco Use     Smoking status: Former Smoker     Packs/day: 0.50     Types: Cigarettes     Smokeless tobacco: Never Used   Substance Use Topics     Alcohol use: Yes     Comment: socially     If you drink alcohol do you typically have >3 drinks per day or >7 drinks per week? No    Alcohol Use 5/12/2021   Prescreen: >3 drinks/day or >7 drinks/week? No   Prescreen: >3 drinks/day or >7 drinks/week? -   No flowsheet data found.    Reviewed orders with patient.  Reviewed health maintenance and  updated orders accordingly - Yes  BP Readings from Last 3 Encounters:   21 110/74   21 (!) 128/21   20 110/74    Wt Readings from Last 3 Encounters:   21 79.4 kg (175 lb)   21 81.7 kg (180 lb 3.2 oz)   20 77.1 kg (170 lb)                  Patient Active Problem List   Diagnosis     CARDIOVASCULAR SCREENING; LDL GOAL LESS THAN 160     Spontaneous onset of labor after 35 but before 39 completed weeks gestation with delivery by planned  section     Insufficient prenatal care     History of methamphetamine use      (normal spontaneous vaginal delivery)     Anemia     KARMEN (generalized anxiety disorder)     Cervical high risk HPV (human papillomavirus) test positive     Past Surgical History:   Procedure Laterality Date     ANKLE SURGERY Left     x 3     CHOLECYSTECTOMY       DISCECTOMY LUMBAR ANTERIOR      x 2       Social History     Tobacco Use     Smoking status: Former Smoker     Packs/day: 0.50     Types: Cigarettes     Smokeless tobacco: Never Used   Substance Use Topics     Alcohol use: Yes     Comment: socially     Family History   Problem Relation Age of Onset     Thyroid Disease Mother      Endometriosis Mother      Mental Illness Sister      Endometriosis Maternal Grandmother          Current Outpatient Medications   Medication Sig Dispense Refill     clobetasol (TEMOVATE) 0.05 % external cream Apply topically 2 times daily 60 g 4     levonorgestrel (MIRENA) 20 MCG/24HR IUD 1 each by Intrauterine route once Replace 2023       Allergies   Allergen Reactions     Blood Transfusion Related (Informational Only) Other (See Comments)     Patient has a history of a clinically significant antibody against RBC antigens.  A delay in compatible RBCs may occur.     Blood-Group Specific Substance      Patient has an anti-Big E. Blood product orders may be delayed. Draw two purple top tubes and one red top tube for all Type and Screen/Type and Crossmatch orders.      Oxycodone-Acetaminophen Itching and Nausea and Vomiting     Legs itch       Breast Cancer Screening:  Any new diagnosis of family breast, ovarian, or bowel cancer? No    FSH-7:   Breast CA Risk Assessment (FHS-7) 5/12/2021   Did any of your first-degree relatives have breast or ovarian cancer? No   Did any of your relatives have bilateral breast cancer? No   Did any man in your family have breast cancer? No   Did any woman in your family have breast and ovarian cancer? No   Did any woman in your family have breast cancer before age 50 y? No   Do you have 2 or more relatives with breast and/or ovarian cancer? Yes   Do you have 2 or more relatives with breast and/or bowel cancer? No     Patient under 40 years of age: Routine Mammogram Screening not recommended.   Pertinent mammograms are reviewed under the imaging tab.    History of abnormal Pap smear: YES - updated in Problem List and Health Maintenance accordingly  PAP / HPV Latest Ref Rng & Units 1/30/2020   PAP - NIL   HPV 16 DNA NEG:Negative Negative   HPV 18 DNA NEG:Negative Negative   OTHER HR HPV NEG:Negative Positive(A)     Reviewed and updated as needed this visit by clinical staff  Tobacco  Allergies  Meds  Problems  Med Hx  Surg Hx  Fam Hx          Reviewed and updated as needed this visit by Provider  Tobacco  Allergies  Meds  Problems  Med Hx  Surg Hx  Fam Hx           Review of Systems   Constitutional: Negative for chills and fever.   HENT: Negative for congestion, ear pain, hearing loss and sore throat.    Eyes: Negative for pain and visual disturbance.   Respiratory: Negative for cough and shortness of breath.    Cardiovascular: Negative for chest pain, palpitations and peripheral edema.   Gastrointestinal: Negative for abdominal pain, constipation, diarrhea, heartburn, hematochezia and nausea.   Genitourinary: Negative for dysuria, frequency, genital sores, hematuria and urgency.   Musculoskeletal: Negative for arthralgias, joint  "swelling and myalgias.   Skin: Positive for rash.   Neurological: Negative for dizziness, weakness, headaches and paresthesias.   Psychiatric/Behavioral: Negative for mood changes. The patient is not nervous/anxious.         OBJECTIVE:   /74   Pulse 70   Temp 98.1  F (36.7  C) (Temporal)   Resp 16   Ht 1.676 m (5' 6\")   Wt 79.4 kg (175 lb)   LMP 05/06/2021   BMI 28.25 kg/m    Physical Exam  GENERAL: healthy, alert and no distress  EYES: Eyes grossly normal to inspection, PERRL and conjunctivae and sclerae normal  HENT: ear canals and TM's normal, nose and mouth without ulcers or lesions  NECK: no adenopathy, no asymmetry, masses, or scars and thyroid normal to palpation  RESP: lungs clear to auscultation - no rales, rhonchi or wheezes  BREAST: normal without masses, tenderness or nipple discharge and no palpable axillary masses or adenopathy  CV: regular rate and rhythm, normal S1 S2, no S3 or S4, no murmur, click or rub, no peripheral edema and peripheral pulses strong  ABDOMEN: soft, nontender, no hepatosplenomegaly, no masses and bowel sounds normal   (female): normal female external genitalia, normal urethral meatus, vaginal mucosa pink, moist, well rugated, and normal cervix/adnexa/uterus without masses or discharge  MS: no gross musculoskeletal defects noted, no edema  SKIN: psoriatic plaques on bilateral lower extremity  NEURO: Normal strength and tone, mentation intact and speech normal  PSYCH: mentation appears normal, affect normal/bright    Diagnostic Test Results:  Labs reviewed in Epic    ASSESSMENT/PLAN:   1. Routine general medical examination at a health care facility    2. Need for tetanus booster  - TDAP VACCINE (Adacel, Boostrix)  [8000755]    3. Screening for malignant neoplasm of cervix  - Pap imaged thin layer screen with HPV - recommended age 30 - 65 years (select HPV order below)    4. Screen for STD (sexually transmitted disease)  - NEISSERIA GONORRHOEA PCR  - CHLAMYDIA " "TRACHOMATIS PCR    5. Psoriasis  Will have patient start Clobetasol as needed. Encouraged keeping skin well hydrated. May need to consider dermatology consult if more bothersome.   - clobetasol (TEMOVATE) 0.05 % external cream; Apply topically 2 times daily  Dispense: 60 g; Refill: 4    6. History of methamphetamine use  In remission for the last 1.5 years. Has been seeing a counselor every other week. Has a great relationship with her. Very poor dentition related to drug use. She will check with her insurance if a referral could be placed for medical necessity otherwise encouraged patient to inquire about the [facility] dental program.     Patient has been advised of split billing requirements and indicates understanding: Yes  COUNSELING:  Reviewed preventive health counseling, as reflected in patient instructions    Estimated body mass index is 28.25 kg/m  as calculated from the following:    Height as of this encounter: 1.676 m (5' 6\").    Weight as of this encounter: 79.4 kg (175 lb).    Weight management plan: Discussed healthy diet and exercise guidelines    She reports that she has quit smoking. Her smoking use included cigarettes. She smoked 0.50 packs per day. She has never used smokeless tobacco.      Counseling Resources:  ATP IV Guidelines  Pooled Cohorts Equation Calculator  Breast Cancer Risk Calculator  BRCA-Related Cancer Risk Assessment: FHS-7 Tool  FRAX Risk Assessment  ICSI Preventive Guidelines  Dietary Guidelines for Americans, 2010  USDA's MyPlate  ASA Prophylaxis  Lung CA Screening    PALLAVI Rucker [facility]  "

## 2021-05-12 ENCOUNTER — OFFICE VISIT (OUTPATIENT)
Dept: FAMILY MEDICINE | Facility: CLINIC | Age: 36
End: 2021-05-12
Payer: COMMERCIAL

## 2021-05-12 VITALS
SYSTOLIC BLOOD PRESSURE: 110 MMHG | HEIGHT: 66 IN | HEART RATE: 70 BPM | BODY MASS INDEX: 28.12 KG/M2 | DIASTOLIC BLOOD PRESSURE: 74 MMHG | RESPIRATION RATE: 16 BRPM | WEIGHT: 175 LBS | TEMPERATURE: 98.1 F

## 2021-05-12 DIAGNOSIS — Z12.4 SCREENING FOR MALIGNANT NEOPLASM OF CERVIX: ICD-10-CM

## 2021-05-12 DIAGNOSIS — Z23 NEED FOR TETANUS BOOSTER: ICD-10-CM

## 2021-05-12 DIAGNOSIS — Z11.3 SCREEN FOR STD (SEXUALLY TRANSMITTED DISEASE): ICD-10-CM

## 2021-05-12 DIAGNOSIS — L40.9 PSORIASIS: ICD-10-CM

## 2021-05-12 DIAGNOSIS — Z00.00 ROUTINE GENERAL MEDICAL EXAMINATION AT A HEALTH CARE FACILITY: Primary | ICD-10-CM

## 2021-05-12 DIAGNOSIS — F15.91 HISTORY OF METHAMPHETAMINE USE: ICD-10-CM

## 2021-05-12 PROBLEM — F33.2 SEVERE EPISODE OF RECURRENT MAJOR DEPRESSIVE DISORDER, WITHOUT PSYCHOTIC FEATURES (H): Status: RESOLVED | Noted: 2020-01-30 | Resolved: 2021-05-12

## 2021-05-12 PROCEDURE — 99213 OFFICE O/P EST LOW 20 MIN: CPT | Mod: 25 | Performed by: PHYSICIAN ASSISTANT

## 2021-05-12 PROCEDURE — 90471 IMMUNIZATION ADMIN: CPT | Performed by: PHYSICIAN ASSISTANT

## 2021-05-12 PROCEDURE — 99395 PREV VISIT EST AGE 18-39: CPT | Mod: 25 | Performed by: PHYSICIAN ASSISTANT

## 2021-05-12 PROCEDURE — 87624 HPV HI-RISK TYP POOLED RSLT: CPT | Performed by: PHYSICIAN ASSISTANT

## 2021-05-12 PROCEDURE — 87591 N.GONORRHOEAE DNA AMP PROB: CPT | Performed by: PHYSICIAN ASSISTANT

## 2021-05-12 PROCEDURE — 87491 CHLMYD TRACH DNA AMP PROBE: CPT | Performed by: PHYSICIAN ASSISTANT

## 2021-05-12 PROCEDURE — G0145 SCR C/V CYTO,THINLAYER,RESCR: HCPCS | Performed by: PHYSICIAN ASSISTANT

## 2021-05-12 PROCEDURE — 90715 TDAP VACCINE 7 YRS/> IM: CPT | Performed by: PHYSICIAN ASSISTANT

## 2021-05-12 RX ORDER — CLOBETASOL PROPIONATE 0.5 MG/G
CREAM TOPICAL 2 TIMES DAILY
Qty: 60 G | Refills: 4 | Status: SHIPPED | OUTPATIENT
Start: 2021-05-12

## 2021-05-12 ASSESSMENT — ENCOUNTER SYMPTOMS
HEARTBURN: 0
MYALGIAS: 0
DYSURIA: 0
HEMATOCHEZIA: 0
CONSTIPATION: 0
COUGH: 0
NERVOUS/ANXIOUS: 0
ABDOMINAL PAIN: 0
SHORTNESS OF BREATH: 0
FEVER: 0
WEAKNESS: 0
DIZZINESS: 0
DIARRHEA: 0
ARTHRALGIAS: 0
SORE THROAT: 0
HEMATURIA: 0
PALPITATIONS: 0
PARESTHESIAS: 0
JOINT SWELLING: 0
CHILLS: 0
HEADACHES: 0
EYE PAIN: 0
FREQUENCY: 0
NAUSEA: 0

## 2021-05-12 ASSESSMENT — PATIENT HEALTH QUESTIONNAIRE - PHQ9
10. IF YOU CHECKED OFF ANY PROBLEMS, HOW DIFFICULT HAVE THESE PROBLEMS MADE IT FOR YOU TO DO YOUR WORK, TAKE CARE OF THINGS AT HOME, OR GET ALONG WITH OTHER PEOPLE: NOT DIFFICULT AT ALL
SUM OF ALL RESPONSES TO PHQ QUESTIONS 1-9: 0
SUM OF ALL RESPONSES TO PHQ QUESTIONS 1-9: 0

## 2021-05-12 ASSESSMENT — MIFFLIN-ST. JEOR: SCORE: 1495.54

## 2021-05-13 LAB
C TRACH DNA SPEC QL NAA+PROBE: NEGATIVE
N GONORRHOEA DNA SPEC QL NAA+PROBE: NEGATIVE
SPECIMEN SOURCE: NORMAL
SPECIMEN SOURCE: NORMAL

## 2021-05-14 LAB
COPATH REPORT: NORMAL
PAP: NORMAL

## 2021-05-18 ENCOUNTER — TELEPHONE (OUTPATIENT)
Dept: FAMILY MEDICINE | Facility: CLINIC | Age: 36
End: 2021-05-18

## 2021-05-18 ENCOUNTER — PATIENT OUTREACH (OUTPATIENT)
Dept: FAMILY MEDICINE | Facility: CLINIC | Age: 36
End: 2021-05-18

## 2021-05-18 NOTE — TELEPHONE ENCOUNTER
Reason for Call:  Appointment, Requested Provider:  Dr Gorman    PCP: Nica Harp    Reason for visit: colposcopy    Duration of symptoms:     Have you been treated for this in the past? No    Additional comments:     Can we leave a detailed message on this number? YES    Phone number patient can be reached at: Cell number on file:    Telephone Information:   Mobile [phone]       Best Time:     Call taken on 5/18/2021 at 2:48 PM by Bita Fairbanks

## 2021-06-08 ENCOUNTER — APPOINTMENT (OUTPATIENT)
Dept: FAMILY MEDICINE | Facility: CLINIC | Age: 36
End: 2021-06-08
Payer: COMMERCIAL

## 2021-06-08 ENCOUNTER — OFFICE VISIT (OUTPATIENT)
Dept: FAMILY MEDICINE | Facility: CLINIC | Age: 36
End: 2021-06-08
Payer: COMMERCIAL

## 2021-06-08 VITALS
SYSTOLIC BLOOD PRESSURE: 116 MMHG | WEIGHT: 174.4 LBS | HEART RATE: 102 BPM | RESPIRATION RATE: 24 BRPM | OXYGEN SATURATION: 98 % | BODY MASS INDEX: 28.15 KG/M2 | TEMPERATURE: 97.6 F | DIASTOLIC BLOOD PRESSURE: 78 MMHG

## 2021-06-08 DIAGNOSIS — Z98.890 HISTORY OF COLPOSCOPY: ICD-10-CM

## 2021-06-08 DIAGNOSIS — R87.810 CERVICAL HIGH RISK HPV (HUMAN PAPILLOMAVIRUS) TEST POSITIVE: Primary | ICD-10-CM

## 2021-06-08 LAB — HCG UR QL: NEGATIVE

## 2021-06-08 PROCEDURE — 88305 TISSUE EXAM BY PATHOLOGIST: CPT | Performed by: PATHOLOGY

## 2021-06-08 PROCEDURE — 87624 HPV HI-RISK TYP POOLED RSLT: CPT | Performed by: OBSTETRICS & GYNECOLOGY

## 2021-06-08 PROCEDURE — 81025 URINE PREGNANCY TEST: CPT | Performed by: OBSTETRICS & GYNECOLOGY

## 2021-06-08 PROCEDURE — 57452 EXAM OF CERVIX W/SCOPE: CPT | Performed by: OBSTETRICS & GYNECOLOGY

## 2021-06-08 ASSESSMENT — PAIN SCALES - GENERAL: PAINLEVEL: NO PAIN (0)

## 2021-06-08 NOTE — PROGRESS NOTES
COLPOSCOPY REPORT    INDICATION:  35-year-old female with positive high risk HPV 1 year ago and again recently-therefore she was referred for colposcopy..  Both pap smears were NIL.  She quit smoking a year ago.    After signing the informed consent, the patient was placed in the dorsal lithotomy position.  A Pap smear was obtained followed by a separate endocervical curettage.      Then a dilute vinegar solution was applied to identify possible areas of interest.   I felt that the colposcopy was adequate because I could see the entire squamocolumnar junction and transformation zone.    Findings were as follows: aceto white areas were not   seen        The patient tolerated the procedure well.      Assessment: High risk HPV on Pap smear.    Normal colposcopy today.   She was informed that we will call her  in 1-2 weeks  to discuss results and she should call sooner if cramping or heavy vaginal bleeding or fever or foul vaginal discharge occurs.  I congratulated her that she quit smoking and this is likely to help her immune system fight off this HPV.  It is still quite possible that she will clear the HPV eventually.  I advised her to have another Pap in 1 year.   My nurse Kath was present throughout the procedure.     ART Gorman MD

## 2021-06-08 NOTE — LETTER
"June 21, 2021      Dyan Shanks  [address] [facility] [address]        Dear ,    We are writing to inform you of your test results.    result(s) is/are normal.  Please advise her to repeat the Pap smear in 1 year.  Thanks. ART Gorman MD     Resulted Orders   HCG qualitative urine   Result Value Ref Range    HCG Qual Urine Negative NEG^Negative      Comment:      This test is for screening purposes.  Results should be interpreted along with   the clinical picture.  Confirmation testing is available if warranted by   ordering [id number], HCG Quantitative Pregnancy.     Surgical pathology exam   Result Value Ref Range    Copath Report       Patient Name: DYAN SHANKS  MR#: [id number]  Specimen #: [id number]  Collected: 6/8/2021  Received: 6/9/2021  Reported: 6/10/2021 09:20  Ordering Phy(s): GHASSAN GORMAN    For improved result formatting, select 'View Enhanced Report Format' under   Linked Documents section.    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    CLINICAL HISTORY:  35-year-old, high risk HPV, NILM pap    GROSS:  The specimen is received in formalin, labeled with the patient's name and   other identifying information and  designated \"endocervical curettings\". It consists of a Cytobrush from   which a 1.5 x 0.1 x 0.2 cm aggregate of  tan-white mucoid material is obtained. Wrapped and entirely submitted in   one cassette. (Dictated by: CARMENCITA Jordan 6/9/2021 10:52 AM)    MICROSCOPIC:  The microscopic findings support the diagnosis.    The technical component of this testing was completed at the [facility], with the professional component performed   at the [facility]" [facility]  [facility]atory, [address], [place]  [address] ([phone])    CPT Codes:  A: 89744-JB2    COLLECTION SITE:  Client: [facility]  Location: [facility] (P)         If you have any questions or concerns, please call the clinic at the number listed above.

## 2021-06-10 LAB — COPATH REPORT: NORMAL

## 2021-06-15 ENCOUNTER — PATIENT OUTREACH (OUTPATIENT)
Dept: FAMILY MEDICINE | Facility: CLINIC | Age: 36
End: 2021-06-15

## 2021-06-21 NOTE — RESULT ENCOUNTER NOTE
Cristina/team,Please inform Dyan/ or caretaker  that this result(s) is/are normal.  Please advise her to repeat the Pap smear in 1 year.  Thanks. ART Gorman MD

## 2021-09-25 ENCOUNTER — HEALTH MAINTENANCE LETTER (OUTPATIENT)
Age: 36
End: 2021-09-25

## 2022-05-27 ENCOUNTER — PATIENT OUTREACH (OUTPATIENT)
Dept: FAMILY MEDICINE | Facility: CLINIC | Age: 37
End: 2022-05-27
Payer: COMMERCIAL

## 2022-05-27 DIAGNOSIS — R87.810 CERVICAL HIGH RISK HPV (HUMAN PAPILLOMAVIRUS) TEST POSITIVE: ICD-10-CM

## 2022-07-02 ENCOUNTER — HEALTH MAINTENANCE LETTER (OUTPATIENT)
Age: 37
End: 2022-07-02

## 2022-07-25 NOTE — TELEPHONE ENCOUNTER
FYI to provider - Patient is lost to pap tracking follow-up. Attempts to contact pt have been made per reminder process and there has been no reply and/or no appt scheduled. Contact hx listed below.     1/30/20 NIL pap, + HR HPV (not 16 or 18). Plan: cotest in 1 yr  5/12/21 NIL pap, + HR HPV (not 16 or 18). Plan: [facility] bef 8/12/21 6/8/21 [facility]: no IVAN. HPV: + HR HPV (not 16 or 18). Plan: [facility] in 1 yr.   6/16/21 Pt notified by phone.  5/27/22 Reminder mychart  6/23/22 Reminder call. Spoke to pt.   7/25/22 Lost to follow-up for pap tracking

## 2022-11-10 ENCOUNTER — E-VISIT (OUTPATIENT)
Dept: FAMILY MEDICINE | Facility: CLINIC | Age: 37
End: 2022-11-10
Payer: COMMERCIAL

## 2022-11-10 DIAGNOSIS — R10.13 ABDOMINAL PAIN, EPIGASTRIC: Primary | ICD-10-CM

## 2022-11-10 PROCEDURE — 99207 PR NON-BILLABLE SERV PER CHARTING: CPT | Performed by: PHYSICIAN ASSISTANT

## 2022-11-11 NOTE — PATIENT INSTRUCTIONS
Thank you for choosing us for your care. I think an in-clinic visit would be best next steps based on your symptoms. Please schedule a clinic appointment; you won t be charged for this eVisit.      You can schedule an appointment right here in [facility], or call [phone]

## 2023-04-22 ENCOUNTER — HEALTH MAINTENANCE LETTER (OUTPATIENT)
Age: 38
End: 2023-04-22

## 2023-07-15 ENCOUNTER — HEALTH MAINTENANCE LETTER (OUTPATIENT)
Age: 38
End: 2023-07-15

## 2024-02-10 ENCOUNTER — HEALTH MAINTENANCE LETTER (OUTPATIENT)
Age: 39
End: 2024-02-10

## 2024-09-07 ENCOUNTER — HEALTH MAINTENANCE LETTER (OUTPATIENT)
Age: 39
End: 2024-09-07